# Patient Record
Sex: FEMALE | Race: WHITE | NOT HISPANIC OR LATINO | Employment: UNEMPLOYED | URBAN - METROPOLITAN AREA
[De-identification: names, ages, dates, MRNs, and addresses within clinical notes are randomized per-mention and may not be internally consistent; named-entity substitution may affect disease eponyms.]

---

## 2020-12-01 ENCOUNTER — OFFICE VISIT (OUTPATIENT)
Dept: OBGYN | Facility: CLINIC | Age: 30
End: 2020-12-01
Payer: COMMERCIAL

## 2020-12-01 DIAGNOSIS — O92.70 LACTATION PROBLEM: ICD-10-CM

## 2020-12-01 DIAGNOSIS — O92.29 SORE NIPPLES DUE TO LACTATION: ICD-10-CM

## 2020-12-01 PROCEDURE — 99205 OFFICE O/P NEW HI 60 MIN: CPT | Performed by: NURSE PRACTITIONER

## 2020-12-01 ASSESSMENT — PATIENT HEALTH QUESTIONNAIRE - PHQ9: CLINICAL INTERPRETATION OF PHQ2 SCORE: 0

## 2020-12-02 NOTE — PROGRESS NOTES
Summary: Exclusively breastfeeding but both nipples cracked and scabbed. Engorgement started. Assisted latch to both breasts, with deep asymmetrical firm latch to overcorrect for the sore nipples. Discussed different nipple tx strategies. Transferred 45ml total day 3 of life. Continue breastfeeding both breasts. Follow up early next week.     Subjective:     Raysa Stephen is a 30 y.o. female here for lactation care. She is here today with baby and  (Renzo).    Concerns:   Latch on difficulties , engorgement, cracked/bleeding nipples , nipple pain , sleepy baby and baby always seems hungry     HPI:   Pertinent  history:   Mother does not have a history of advanced maternal age, GDM, hypertension prior to pregnancy, insulin resistance, multiple gestation, PCOS and thyroid disease. Common condition(s) which may interfere with milk supply.  Breast changes in pregnancy: Yes  History of breast surgeries: No      FEEDING HISTORY:    Past breastfeeding history:  First baby   Hospital course: Soreness started worked on latch at [facility].  Currently: Exclusively breastfeeding but both nipples cracked and scabbed. Engorgement started.  Both breasts: Yes most of the time  Bottle feeds: 0x/24h  Supplement: None  Nipple Shield Use: None    Breast Pumping:   Type of pump: None    Maternal ROS:  Constitutional: No fever, chills. Feeling well  Gastrointestinal: Negative for nausea and vomiting  Breasts: No soreness of breasts and Soreness of nipples  Psychiatric: No mood changes, Not experiencing anxiety and Managing ok  Mental Health: No mention of feeling irritable, agitated, angry, overwhelmed, apathy, exhaustion nor having sleep changes outside infant feeds/demands or appetite changes     Objective:     Physical   General: no acute distress  Neurological:  Alert and oriented x3  Breasts: Symetrical , Full, Engorged, Plugged Duct - no evidence and Mastitis  - no S/S  Nipples: abraded, bleeding, cracked,  erythematous, fissure, scabbed/crusting and across face of nipple. Right nipple is fissured at 11 o clock.  Psychiatric:  Normal mood and affect. Her behavior is normal. Judgment and thought content normal   Mental Health:  Did NOT exhibit sadness, crying, feeling overwhelmed, agitation or hypervigilance.    Assessment/Plan & Lactation Counseling:     Infant Weight History:   20 8#2oz  2020 7#10.6oz    Infant intake at Breast:: L 1.0oz     R 0.5oz    Total: 1.5oz day 3  Milk Transfer at this feeding:   Effective breastfeedingInitiation of Feeding: Infant initiates  Position of Feeding:    Right: football  Left: cross cradle  Attachment Achieved: rapidly  Nipple shield: N/A    Discussed  Suck Pattern at the breast: Suck burst and normal rest  Behavior Following Observed Feeding: content  Nipple Pain from:Contact forces of the tongue causing nipple strain resulting in damage and Nipple damage from accumulated microtrauma which lowered failure strength resulting in sudden damage     Latch: Assisted latch  Suckling/Feeding: attaches, audible swallows, baby fed effectively, baby roots, elicits RICHARD and rhythmic  Milk Supply Available: normal  And building    Maternal Diagnosis/Problem:  Cracked nipples due to lactation   Lactation problem shallow latch  Sore nipples due to lactation    BREASTFEEDING PLAN  Discussed concerns and symptoms as listed above in assessment and guidance summarized below.  • Topics reviewed included:  • The nature of infants oral head/neck structure and function and its impact on latch and transfer of milk.   o Observed lingual frenulum  Neck preference this is a normal  finding that should correct itself over the next few weeks.    However when there is a neck preference it can make latching more difficult.    Mom can  use cross cradle on the left and football on the right    Infant head  can be supported in the car seat.    Bottle feeding baby's  can be encouraged to look to  the opposite side of the preference  • Infant can be can talked to from the side encouraging baby to turn to.   •  Sleep or lack of and discussed strategies to manage restorative sleep, although short amounts, significant to the mental health of the mother.  •  Self Care. Discussed support, rest, getting out of the house each day, walk to the mailbox or drive somewhere,  a treat at a drive thru  • Milk supply is dependent on glandular tissue development, hormonal influences, how many times the baby removes milk and how well the breasts are emptied in a 24 hour period. This is a biological reality that we can NOT work around. If, for any reason, your baby is not latching, or you are not able to nurse, then it is important for you to remove the milk instead by pumping or hand expression.  There's no magic trick, tea, food, drink, cookie or supplement that will increase your milk supply. One  must  effectively remove milk to continue to make and maximize milk. In the early days and weeks that can be 8+ times in 24 hours. For older babies, on average 6-7 + times in 24 hours.    •  Feeding:   o Feed your baby every 1.5-3 hours, more often if baby acts hungry.   o Awaken baby for feeding if going over 3 hours in the day.   o Until back to birth weight, ONE four hour at night is acceptable if has had 8 prior feedings in 24 hours.    o Need to get in 8-12 feedings per 24 hours.      o  When bottle-feeding, there are three primary things to consider:    o Nipple Shape:  - Look for a nipple that looks like a “breast at work” not a “breast at rest.”  A “breast at work” has a somewhat cone shape as the nipple and breast tissue is pulled into the baby’s mouth while feeding.  Your baby’s mouth should be able to go around the widest part of the nipple to form a wide-open gape on the bottle like that of a good latch at the breast. In contrast, a breast at rest might look more like, well, a breast: a roundish base with a long  skinny nipple.  If the bottle looks like this, your baby’s lips may not be able to get around the widest part of the nipple because it is just too wide resulting in a narrow gape that would hurt on your nipple. This nipple shape may also make it difficult for your baby to make a complete seal with their lips which leads to air intake and milk spillage.  o Flow Rate of the Nipple:  - The nipple flow should be slow.  Don’t just read the label, but notice how your baby is feeding and trust what you observe.  A study done a few years ago found that “slow flow” varied widely between brands and even between nipples of the same brand.  Try several nipples until you find one that results in a rhythmic sucking pattern but not chugging and gulping.  o Pacing the feeding:  - A slow flow nipple helps, but how you feed the baby is more important.  Good positioning can compensate for a faster flow nipple.  When bottle-feeding, the baby should control how much is consumed at a feeding.  Holding the baby in an upright position with the bottle horizontal ensures that the baby gets milk only when sucking.  Here is a nice video demonstrating this concept of paced bottle feeding,  https://www.[employer].com/watch?v=UdREJ5mIE4Y  •  Supplement:   o No supplement is needed    • Positioning Techniques for bare breast  o Suggested positions Cross cradle and Football  o Fine tune position by making sure your fingers beneath the breast as well as your bra, are out of the way of your baby's chin.  o Positioning:  Many positions shown, great sidelying at 7 minutes.   o See http://globalhealthmedia.org/portfolio-items/positions-for-breastfeeding/?ponzjtotuRU=42217  •  Latch on Techniques for bare breast. Modify for nipple shield use  o Fine tune latch:  - By holding your baby more securely at the breast, assisting your baby to stay attached by:  - Bringing your baby to your breast, not breast to the baby  - Your baby's cheek to touch breast  securely, nose tipped back  - Hold your baby firmly in place so when your baby forgets to suck and picks it back up again your baby is in the correct spot. You will be extinguishing behavior and replacing it with a deeper latch to stimulate suck and provide satisfaction at the breast  - Your baby needs as much breast as deep in the mouth as possible to allow your nipples to heal and for you more importantly to maximize efficiency at the breast  - Latch is asymmetrical, leading with the chin, getting more underneath.  •  Nipple shield: We prefer the 24mm Medela if you are going to use it. Before applying, roll shield in on itself and allow breast to be pulled  in to the tip.  • Nipple care:  • May apply breastmilk  • Moist-oily ointment after feeding/pumping, ie Lanolin nipple butter, coconut or olive oil, if desired/needed 2-3 times/day until nipples are healed  • You do not need to wash this off before pumping or feeding the baby  • You may want to remove baby from breast when active swallowing stops to avoid prolonged nipple compression  • Soft shells recommended  Hydrogels recommended      Connect with other mothers:  o Facebook:   - [place] Breastfeeds: [email or web address]  - Well-Nourished Babies (Private group for questions and support): https://www.facebook.com/groups/358666455637553/  o Breastfeeding [place] for support not assessment: Tuesday  at 11am by [employer],  you will be sent an invitation.   - You may instead copy and paste this link:    [email or web address]    - Passcode  [id number]  - You may share this link with friends; please don't post on social media      Follow up   Mom is encouraged to e-mail to update how the plan is working.  Pediatrician appointment: 12/11/20    Follow-up for infant weight check and dyad breastfeeding evaluation in 6-7 day(s)  Please call [phone] if you have not scheduled your next  appointment    A total of 60 minutes, this does not include infant assessment time, were spent face to face with more than 50% spent counseling and coordinating care as detailed in the above note.     PLEASE NOTE: Some of this note was created using voice recognition software. I have made every reasonable attempt to correct obvious errors, but I expect that there may be errors of grammar and possibly content that I did not discover prior finalizing this note.  NURY Garcia.

## 2020-12-08 ENCOUNTER — OFFICE VISIT (OUTPATIENT)
Dept: OBGYN | Facility: CLINIC | Age: 30
End: 2020-12-08
Payer: COMMERCIAL

## 2020-12-08 DIAGNOSIS — O92.70 LACTATION PROBLEM: ICD-10-CM

## 2020-12-08 PROCEDURE — 99215 OFFICE O/P EST HI 40 MIN: CPT | Performed by: NURSE PRACTITIONER

## 2020-12-08 NOTE — PROGRESS NOTES
Summary: Exclusively breastfeeding, no pumping with appropriate weight gain. Nipples healed. Today he was not as interested in the feeding but removed 0.8oz from the left breast without maternal pain, independent latch.  Introduced pump and removed 60ml, transitional milk for a night feeding by dad.  Continue breastfeeding both breasts, may introduce pumping and a bottle Lingual frenulum not creating discomfort. Follow up in 2 weeks.     Subjective:     Raysa Stephen is a 30 y.o. female here for lactation care. She is here today with baby and  (Renzo).    Concerns:   Weight and feeding evaluation  HPI:   Pertinent  history:   Mother does not have a history of advanced maternal age, GDM, hypertension prior to pregnancy, insulin resistance, multiple gestation, PCOS and thyroid disease. Common condition(s) which may interfere with milk supply.  Breast changes in pregnancy: Yes  History of breast surgeries: No      FEEDING HISTORY:    Past breastfeeding history:  First baby   Hospital course: Soreness started worked on latch at [facility].  Prior to visit on 20: Exclusively breastfeeding but both nipples cracked and scabbed. Engorgement started.  Currently 20:Exclusively breastfeeding, no pumping  Both breasts: Yes most of the time  Bottle feeds: 0x/24h  Supplement: None  Nipple Shield Use: None    Breast Pumping:   Frequency: None  Type of pump: Medela PNS    Maternal ROS:  Constitutional: No fever, chills. Feeling well  Gastrointestinal: Negative for nausea and vomiting  Breasts: No soreness of breasts or  nipples  Psychiatric: No mood changes, Not experiencing anxiety and Managing ok  Mental Health: No mention of feeling irritable, agitated, angry, overwhelmed, apathy, exhaustion nor having sleep changes outside infant feeds/demands or appetite changes     Objective:     Physical   General: no acute distress  Neurological:  Alert and oriented x3  Breasts: Symetrical , Plugged Duct - no  evidence and Mastitis  - no S/S  Nipples:intact  Psychiatric:  Normal mood and affect. Her behavior is normal. Judgment and thought content normal   Mental Health:  Did NOT exhibit sadness, crying, feeling overwhelmed, agitation or hypervigilance.    Assessment/Plan & Lactation Counseling:     Infant Weight History:   20 8#2oz  2020 7#10.6oz  20207#15.9oz  5oz in 7 days Follow ped office in 3 days    Infant intake at Breast:: L 0.8oz    R 0ml   Total: 0.8oz  Milk Transfer at this feeding:   Effective breastfeeding  Given history, but not this feeding. Watching  Initiation of Feeding: Infant initiates  Position of Feeding:    Right: cross cradle  Left: cross cradle  Attachment Achieved: rapidly  Nipple shield: N/A    Discussed  Suck Pattern at the breast: Suck burst and normal rest  Behavior Following Observed Feeding: content  Nipple Pain None  Latch: Assisted latch  Suckling/Feeding: attaches, audible swallows, baby fed effectively, baby roots, elicits RICHARD and rhythmic  Milk Supply Available: normal  Transitional milk so supply still building    Maternal Diagnosis/Problem:  Lactation Problem: management    BREASTFEEDING PLAN  Discussed concerns and symptoms as listed above in assessment and guidance summarized below.  Topics reviewed included:  • The nature of infants oral head/neck structure and function and its impact on latch and transfer of milk.   o Observed lingual frenulum  Neck preference this is a normal  finding that should correct itself over the next few weeks.    However when there is a neck preference it can make latching more difficult.    Mom can  use cross cradle on the left and football on the right    Infant head  can be supported in the car seat.    Bottle feeding baby's  can be encouraged to look to the opposite side of the preference  • Infant can be can talked to from the side encouraging baby to turn to.   •  Sleep or lack of and discussed strategies to manage  restorative sleep, although short amounts, significant to the mental health of the mother.  •  Self Care. Discussed support, rest, getting out of the house each day, walk to the mailbox or drive somewhere,  a treat at a drive thru  • Milk supply is dependent on glandular tissue development, hormonal influences, how many times the baby removes milk and how well the breasts are emptied in a 24 hour period. This is a biological reality that we can NOT work around. If, for any reason, your baby is not latching, or you are not able to nurse, then it is important for you to remove the milk instead by pumping or hand expression.  There's no magic trick, tea, food, drink, cookie or supplement that will increase your milk supply. One  must  effectively remove milk to continue to make and maximize milk. In the early days and weeks that can be 8+ times in 24 hours. For older babies, on average 6-7 + times in 24 hours.    •  Feeding:   o Feed your baby every 1.5-3 hours, more often if baby acts hungry.   o Awaken baby for feeding if going over 3 hours in the day.   o Until back to birth weight, ONE four hour at night is acceptable if has had 8 prior feedings in 24 hours.    o Need to get in 8-12 feedings per 24 hours  o  When bottle-feeding, there are three primary things to consider:    o Nipple Shape:  - Look for a nipple that looks like a “breast at work” not a “breast at rest.”  A “breast at work” has a somewhat cone shape as the nipple and breast tissue is pulled into the baby’s mouth while feeding.  Your baby’s mouth should be able to go around the widest part of the nipple to form a wide-open gape on the bottle like that of a good latch at the breast. In contrast, a breast at rest might look more like, well, a breast: a roundish base with a long skinny nipple.  If the bottle looks like this, your baby’s lips may not be able to get around the widest part of the nipple because it is just too wide resulting in a  narrow gape that would hurt on your nipple. This nipple shape may also make it difficult for your baby to make a complete seal with their lips which leads to air intake and milk spillage.  o Flow Rate of the Nipple:  - The nipple flow should be slow.  Don’t just read the label, but notice how your baby is feeding and trust what you observe.  A study done a few years ago found that “slow flow” varied widely between brands and even between nipples of the same brand.  Try several nipples until you find one that results in a rhythmic sucking pattern but not chugging and gulping.  o Pacing the feeding: Reviewed  - A slow flow nipple helps, but how you feed the baby is more important.  Good positioning can compensate for a faster flow nipple.  When bottle-feeding, the baby should control how much is consumed at a feeding.  Holding the baby in an upright position with the bottle horizontal ensures that the baby gets milk only when sucking.  Here is a nice video demonstrating this concept of paced bottle feeding,  https://www.youSpeedyboyube.com/watch?v=FaHEQ0uRI0Z  •  Supplement:   o No supplement is needed    Positioning and latch mastered     Connect with other mothers:  o Facebook:   - [place] Breast[facility]: [email or web address].[email or web address]/[email or web address].[email or web address]  - Well-Nourished Babies (Private group for questions and support): https://www.facebook.com/groups/368079001180679/  o Breastfeeding [place] for support not assessment: Tuesday  at 11am by [employer],  you will be sent an invitation.   - You may instead copy and paste this link:    [email or web address]    - Passcode  [id number]  - You may share this link with friends; please don't post on social media      Follow up   Mom is encouraged to e-mail to update how the plan is working.  Pediatrician appointment: 12/11/20  Follow-up for infant weight check and dyad breastfeeding evaluation in 14 day(s)  Please call [phone] if you  have not scheduled your next appointment    A total of 60 minutes, this does not include infant assessment time, were spent face to face with more than 50% spent counseling and coordinating care as detailed in the above note.     PLEASE NOTE: Some of this note was created using voice recognition software. I have made every reasonable attempt to correct obvious errors, but I expect that there may be errors of grammar and possibly content that I did not discover prior finalizing this note.  NURY Garcia.

## 2020-12-17 ENCOUNTER — OFFICE VISIT (OUTPATIENT)
Dept: OBGYN | Facility: CLINIC | Age: 30
End: 2020-12-17
Payer: COMMERCIAL

## 2020-12-17 DIAGNOSIS — O92.70 LACTATION PROBLEM: ICD-10-CM

## 2020-12-17 PROCEDURE — 99215 OFFICE O/P EST HI 40 MIN: CPT | Performed by: NURSE PRACTITIONER

## 2020-12-17 NOTE — PROGRESS NOTES
Summary: Exclusively breastfeeding 10-12x/day, no long night breaks yet.  Pumping for dad to give a nighttime bottle.   Nipples healed. Today he removed 2.5oz from the left breast, the one with more milk and an additional 0.3oz from the right. Self regulates well. Hiccups. Continue breastfeeding both breasts,  follow up in 2 weeks if needed.     Subjective:     Raysa Stephen is a 30 y.o. female here for lactation care. She is here today with baby and  (Renzo).    Concerns:   Weight and feeding evaluation    HPI:   Pertinent  history:   Mother does not have a history of advanced maternal age, GDM, hypertension prior to pregnancy, insulin resistance, multiple gestation, PCOS and thyroid disease. Common condition(s) which may interfere with milk supply.  Breast changes in pregnancy: Yes  History of breast surgeries: No      FEEDING HISTORY:    Past breastfeeding history:  First baby   Hospital course: Soreness started worked on latch at [facility].  Prior to visit on 20: Exclusively breastfeeding but both nipples cracked and scabbed. Engorgement started.  Prior to  20:Exclusively breastfeeding, no pumping  Currently 19 Exclusively breastfeeding 10-12x/day, no long night breaks yet.  Pumping for dad to give a nighttime bottle.   Nipples healed.     Both breasts: Yes most of the time  Bottle feeds: 0x/24h  Supplement: None  Nipple Shield Use: None    Breast Pumping:   Frequency: None  Type of pump: Medela PNS    Maternal ROS:  Constitutional: No fever, chills. Feeling well  Gastrointestinal: Negative for nausea and vomiting  Breasts: No soreness of breasts or  nipples  Psychiatric: No mood changes, Not experiencing anxiety and Managing ok  Mental Health: No mention of feeling irritable, agitated, angry, overwhelmed, apathy, exhaustion nor having sleep changes outside infant feeds/demands or appetite changes     Objective:     Physical   General: no acute distress  Neurological:  Alert and  oriented x3  Breasts: Symetrical , Plugged Duct - no evidence and Mastitis  - no S/S  Nipples:intact  Psychiatric:  Normal mood and affect. Her behavior is normal. Judgment and thought content normal   Mental Health:  Did NOT exhibit sadness, crying, feeling overwhelmed, agitation or hypervigilance.    Assessment/Plan & Lactation Counseling:     Infant Weight History:   11/28/20     8#2oz  12/1/2020   7#10.6oz  12/8/2020   7#15.9oz  5oz in 7 days Follow ped office in 3 days  12/17/20     8#10.0oz  Infant intake at Breast:: L 2.5oz    R 0.3oz  Total: 2.8oz  Milk Transfer at this feeding:   Effective breastfeeding    Initiation of Feeding: Infant initiates  Position of Feeding:    Right: cross cradle  Left: cross cradle  Attachment Achieved: rapidly  Nipple shield: N/A    Discussed  Suck Pattern at the breast: Suck burst and normal rest  Behavior Following Observed Feeding: content  Nipple Pain None  Latch: Assisted latch  Suckling/Feeding: attaches, audible swallows, baby fed effectively, baby roots, elicits RICHARD and rhythmic  Milk Supply Available: normal      Maternal Diagnosis/Problem:  Lactation Problem: management    BREASTFEEDING PLAN  Discussed concerns and symptoms as listed above in assessment and guidance summarized below.  Topics reviewed included:  •  Self Care. Discussed support, rest, getting out of the house each day, walk to the mailbox or drive somewhere,  a treat at a drive thru    • Milk supply is dependent on glandular tissue development, hormonal influences, how many times the baby removes milk and how well the breasts are emptied in a 24 hour period. This is a biological reality that we can NOT work around. If, for any reason, your baby is not latching, or you are not able to nurse, then it is important for you to remove the milk instead by pumping or hand expression.  There's no magic trick, tea, food, drink, cookie or supplement that will increase your milk supply. One  must  effectively  remove milk to continue to make and maximize milk. In the early days and weeks that can be 8+ times in 24 hours. For older babies, on average 6-7 + times in 24 hours.      •  Feeding:   o Feed your baby every 1.5-2.5 hours, more often if baby acts hungry.   o Awaken baby for feeding if going over 3 hours in the day.   o Until back to birth weight, ONE four hour at night is acceptable if has had 8 prior feedings in 24 hours.    - Need to get in 10-12 feedings per 24 hours  •  Supplement:   o No supplement is needed    Positioning and latch mastered     Connect with other mothers: Thank you for joining the group    Follow up   Mom is encouraged to e-mail to update how the plan is working.  Pediatrician appointment: January, 2 month [facility]  Follow-up for infant weight check and dyad breastfeeding evaluation in 14 day(s) if needed  Please call [phone] if you have not scheduled your next appointment    A total of 55 minutes, this does not include infant assessment time, were spent face to face with more than 50% spent counseling and coordinating care as detailed in the above note.     PLEASE NOTE: Some of this note was created using voice recognition software. I have made every reasonable attempt to correct obvious errors, but I expect that there may be errors of grammar and possibly content that I did not discover prior finalizing this note.  NURY Garcia.

## 2021-02-05 ENCOUNTER — OFFICE VISIT (OUTPATIENT)
Dept: OBGYN | Facility: CLINIC | Age: 31
End: 2021-02-05
Payer: COMMERCIAL

## 2021-02-05 DIAGNOSIS — O92.70 LACTATION PROBLEM: ICD-10-CM

## 2021-02-05 PROCEDURE — 99215 OFFICE O/P EST HI 40 MIN: CPT | Performed by: NURSE PRACTITIONER

## 2021-02-05 PROCEDURE — G2212 PROLONG OUTPT/OFFICE VIS: HCPCS | Performed by: NURSE PRACTITIONER

## 2021-02-05 NOTE — PROGRESS NOTES
Summary: Exclusively breastfeeding 8+x/day, established sleeping pattern 930-6am, feeding at 230am.  Pumping after morning feeding for 5 oz to freeze or  for dad to give a bottle.    Spitting up frequently but not in pain. Today he removed 2.8oz from the right breast, and an hour later an additional 1.1oz from the left side. Had spit up 12ml in between sides. Feeding well but losing 2oz per day with spit up and not making it up. Plan to keep sleep routine, feed short times per [place] routine but more frequently, to finish the feeding like today.  Do not interrupt naps but dream feed after 2 hours.  Continue offering both breast. Mild brachiocephaly noted, does tummy time, will see Ped PT for exercises. Will begin to wear baby.  Follow up weight check in a week. Consult in 10-14 days.     Subjective:     Raysa Stephen is a 30 y.o. female here for lactation care. She is here today with babyBrooklyn    Concerns:   Weight and feeding evaluation, spitting up, growth has slowed    HPI:   Pertinent  history:   Mother does not have a history of advanced maternal age, GDM, hypertension prior to pregnancy, insulin resistance, multiple gestation, PCOS and thyroid disease. Common condition(s) which may interfere with milk supply.  Breast changes in pregnancy: Yes  History of breast surgeries: No      FEEDING HISTORY:    Past breastfeeding history:  First baby   Hospital course: Soreness started worked on latch at [facility].  Prior to visit on 20: Exclusively breastfeeding but both nipples cracked and scabbed. Engorgement started.  Prior to visit on  20:Exclusively breastfeeding, no pumping  Prior to visit on  20 Exclusively breastfeeding 10-12x/day, no long night breaks yet.  Pumping for dad to give a nighttime bottle.   Nipples healed.   Currently 20 Exclusively breastfeeding 8+x/day, established sleeping pattern 930-6am, feeding at 230am.  Pumping after morning feeding for 5 oz to freeze or   for dad to give a bottle.    Spitting up frequently but not in pain    Both breasts: Yes most of the time  Bottle feeds: 1x/24h several times per week  Supplement: None  Nipple Shield Use: None    Breast Pumping:   Frequency: None  Type of pump: Medela PNS    Maternal ROS:  Constitutional: No fever, chills. Feeling well  Gastrointestinal: Negative for nausea and vomiting  Breasts: No soreness of breasts or  nipples  Psychiatric: No mood changes, Not experiencing anxiety and Managing ok  Mental Health: No mention of feeling irritable, agitated, angry, overwhelmed, apathy, exhaustion nor having sleep changes outside infant feeds/demands or appetite changes     Objective:     Physical   General: no acute distress  Neurological:  Alert and oriented x3  Breasts: Symetrical , Plugged Duct - no evidence and Mastitis  - no S/S  Nipples:intact  Psychiatric:  Normal mood and affect. Her behavior is normal. Judgment and thought content normal   Mental Health:  Did NOT exhibit sadness, crying, feeling overwhelmed, agitation or hypervigilance.    Assessment/Plan & Lactation Counseling:     Infant Weight History:   11/28/20     8#2oz  12/1/2020   7#10.6oz  12/8/2020   7#15.9oz  5oz in 7 days Follow ped office in 3 days  12/17/20     8#10.0oz  2/5/21  11#0.1oz Percentile slowed, 4.1oz gain from peds office 9 days ago.    Infant intake at Breast:: L 1.1oz    R 2.8oz  Total: 3.9oz  Milk Transfer at this feeding:   Effective breastfeeding    Initiation of Feeding: Infant initiates  Position of Feeding:    Right: cradle  Left: cradle  Attachment Achieved: rapidly  Nipple shield: N/A    Discussed  Suck Pattern at the breast: Suck burst and normal rest  Behavior Following Observed Feeding: content  Nipple Pain None  Latch: Assisted latch  Suckling/Feeding: attaches, audible swallows, baby fed effectively, baby roots, elicits RICHARD and rhythmic  Milk Supply Available: normal      Maternal Diagnosis/Problem:  Lactation Problem: management  "with reflux and slow gain    BREASTFEEDING PLAN  Discussed concerns and symptoms as listed above in assessment and guidance summarized below.  Topics reviewed included:  •  Feeding:   o Offer both sides but honor his cues.  o Offer second side an hour later  o Wear him, keeping upright to decrease spitting up    o Dream feed in the day if naps are longer than 2 hours, otherwise \"hourly\" feedings.  - Really just finishing the feeding an hour later   o Maintain sleep routine     o Supplement:   - No supplement is needed    Positioning and latch mastered     Continue to connect with other mothers     Follow up   Mom is encouraged to e-mail to update how the plan is working.  Pediatrician appointment: March, 4 month [facility]  Follow-up for infant weight check and dyad breastfeeding evaluation in 7 day(s)   Please call [phone] if you have not scheduled your next appointment    A total of 92 minutes, this does not include infant assessment time, were spent face to face with more than 50% spent counseling and coordinating care as detailed in the above note.     PLEASE NOTE: Some of this note was created using voice recognition software. I have made every reasonable attempt to correct obvious errors, but I expect that there may be errors of grammar and possibly content that I did not discover prior finalizing this note.  NURY Garcia.  "

## 2021-12-17 LAB
ABO GROUP BLD: NORMAL
BLD GP AB SCN SERPL QL: NEGATIVE
C TRACH DNA GENITAL QL NAA+PROBE: NEGATIVE
HBV SURFACE AG SERPL QL IA: NEGATIVE
N GONORRHOEA DNA GENITAL QL NAA+PROBE: NEGATIVE
RH BLD: POSITIVE
RUBV IGG SERPL IA-ACNC: NORMAL

## 2022-04-29 LAB
GLUCOSE 1H P 50 G GLC PO SERPL-MCNC: 108 MG/DL
TREPONEMA PALLIDUM IGG+IGM AB [PRESENCE] IN SERUM OR PLASMA BY IMMUNOASSAY: NORMAL

## 2022-06-29 LAB — GP B STREP DNA SPEC QL NAA+PROBE: NEGATIVE

## 2022-07-22 ENCOUNTER — HOSPITAL ENCOUNTER (INPATIENT)
Facility: MEDICAL CENTER | Age: 32
LOS: 1 days | End: 2022-07-23
Attending: OBSTETRICS & GYNECOLOGY | Admitting: OBSTETRICS & GYNECOLOGY
Payer: COMMERCIAL

## 2022-07-22 ENCOUNTER — ANESTHESIA EVENT (OUTPATIENT)
Dept: ANESTHESIOLOGY | Facility: MEDICAL CENTER | Age: 32
End: 2022-07-22
Payer: COMMERCIAL

## 2022-07-22 ENCOUNTER — ANESTHESIA (OUTPATIENT)
Dept: ANESTHESIOLOGY | Facility: MEDICAL CENTER | Age: 32
End: 2022-07-22
Payer: COMMERCIAL

## 2022-07-22 DIAGNOSIS — Z91.89 AT RISK FOR BREASTFEEDING DIFFICULTY: Primary | ICD-10-CM

## 2022-07-22 LAB
BASOPHILS # BLD AUTO: 0.4 % (ref 0–1.8)
BASOPHILS # BLD: 0.04 K/UL (ref 0–0.12)
EOSINOPHIL # BLD AUTO: 0.07 K/UL (ref 0–0.51)
EOSINOPHIL NFR BLD: 0.6 % (ref 0–6.9)
ERYTHROCYTE [DISTWIDTH] IN BLOOD BY AUTOMATED COUNT: 39.6 FL (ref 35.9–50)
HCT VFR BLD AUTO: 37.6 % (ref 37–47)
HGB BLD-MCNC: 13.3 G/DL (ref 12–16)
HOLDING TUBE BB 8507: NORMAL
IMM GRANULOCYTES # BLD AUTO: 0.08 K/UL (ref 0–0.11)
IMM GRANULOCYTES NFR BLD AUTO: 0.7 % (ref 0–0.9)
LYMPHOCYTES # BLD AUTO: 1.38 K/UL (ref 1–4.8)
LYMPHOCYTES NFR BLD: 12.3 % (ref 22–41)
MCH RBC QN AUTO: 30.2 PG (ref 27–33)
MCHC RBC AUTO-ENTMCNC: 35.4 G/DL (ref 33.6–35)
MCV RBC AUTO: 85.3 FL (ref 81.4–97.8)
MONOCYTES # BLD AUTO: 0.78 K/UL (ref 0–0.85)
MONOCYTES NFR BLD AUTO: 7 % (ref 0–13.4)
NEUTROPHILS # BLD AUTO: 8.86 K/UL (ref 2–7.15)
NEUTROPHILS NFR BLD: 79 % (ref 44–72)
NRBC # BLD AUTO: 0 K/UL
NRBC BLD-RTO: 0 /100 WBC
PLATELET # BLD AUTO: 218 K/UL (ref 164–446)
PMV BLD AUTO: 10.2 FL (ref 9–12.9)
RBC # BLD AUTO: 4.41 M/UL (ref 4.2–5.4)
WBC # BLD AUTO: 11.2 K/UL (ref 4.8–10.8)

## 2022-07-22 PROCEDURE — A9270 NON-COVERED ITEM OR SERVICE: HCPCS | Performed by: OBSTETRICS & GYNECOLOGY

## 2022-07-22 PROCEDURE — 770002 HCHG ROOM/CARE - OB PRIVATE (112)

## 2022-07-22 PROCEDURE — 85025 COMPLETE CBC W/AUTO DIFF WBC: CPT

## 2022-07-22 PROCEDURE — 700105 HCHG RX REV CODE 258: Performed by: OBSTETRICS & GYNECOLOGY

## 2022-07-22 PROCEDURE — 59409 OBSTETRICAL CARE: CPT

## 2022-07-22 PROCEDURE — 36415 COLL VENOUS BLD VENIPUNCTURE: CPT

## 2022-07-22 PROCEDURE — 304965 HCHG RECOVERY SERVICES

## 2022-07-22 PROCEDURE — 302449 STATCHG TRIAGE ONLY (STATISTIC)

## 2022-07-22 PROCEDURE — 700111 HCHG RX REV CODE 636 W/ 250 OVERRIDE (IP): Performed by: OBSTETRICS & GYNECOLOGY

## 2022-07-22 PROCEDURE — 700102 HCHG RX REV CODE 250 W/ 637 OVERRIDE(OP): Performed by: OBSTETRICS & GYNECOLOGY

## 2022-07-22 RX ORDER — SODIUM CHLORIDE, SODIUM LACTATE, POTASSIUM CHLORIDE, CALCIUM CHLORIDE 600; 310; 30; 20 MG/100ML; MG/100ML; MG/100ML; MG/100ML
INJECTION, SOLUTION INTRAVENOUS CONTINUOUS
Status: CANCELLED | OUTPATIENT
Start: 2022-07-22

## 2022-07-22 RX ORDER — MISOPROSTOL 200 UG/1
600 TABLET ORAL
Status: DISCONTINUED | OUTPATIENT
Start: 2022-07-22 | End: 2022-07-23 | Stop reason: HOSPADM

## 2022-07-22 RX ORDER — TERBUTALINE SULFATE 1 MG/ML
0.25 INJECTION, SOLUTION SUBCUTANEOUS
Status: DISCONTINUED | OUTPATIENT
Start: 2022-07-22 | End: 2022-07-22 | Stop reason: HOSPADM

## 2022-07-22 RX ORDER — OXYCODONE HYDROCHLORIDE 5 MG/1
5 TABLET ORAL EVERY 4 HOURS PRN
Status: DISCONTINUED | OUTPATIENT
Start: 2022-07-22 | End: 2022-07-23 | Stop reason: HOSPADM

## 2022-07-22 RX ORDER — OXYTOCIN 10 [USP'U]/ML
10 INJECTION, SOLUTION INTRAMUSCULAR; INTRAVENOUS
Status: CANCELLED | OUTPATIENT
Start: 2022-07-22

## 2022-07-22 RX ORDER — DOCUSATE SODIUM 100 MG/1
100 CAPSULE, LIQUID FILLED ORAL 2 TIMES DAILY
Status: DISCONTINUED | OUTPATIENT
Start: 2022-07-22 | End: 2022-07-23 | Stop reason: HOSPADM

## 2022-07-22 RX ORDER — OXYTOCIN 10 [USP'U]/ML
10 INJECTION, SOLUTION INTRAMUSCULAR; INTRAVENOUS
Status: DISCONTINUED | OUTPATIENT
Start: 2022-07-22 | End: 2022-07-22 | Stop reason: HOSPADM

## 2022-07-22 RX ORDER — VITAMIN A ACETATE, BETA CAROTENE, ASCORBIC ACID, CHOLECALCIFEROL, .ALPHA.-TOCOPHEROL ACETATE, DL-, THIAMINE MONONITRATE, RIBOFLAVIN, NIACINAMIDE, PYRIDOXINE HYDROCHLORIDE, FOLIC ACID, CYANOCOBALAMIN, CALCIUM CARBONATE, FERROUS FUMARATE, ZINC OXIDE, CUPRIC OXIDE 3080; 12; 120; 400; 1; 1.84; 3; 20; 22; 920; 25; 200; 27; 10; 2 [IU]/1; UG/1; MG/1; [IU]/1; MG/1; MG/1; MG/1; MG/1; MG/1; [IU]/1; MG/1; MG/1; MG/1; MG/1; MG/1
1 TABLET, FILM COATED ORAL EVERY MORNING
Status: DISCONTINUED | OUTPATIENT
Start: 2022-07-22 | End: 2022-07-23 | Stop reason: HOSPADM

## 2022-07-22 RX ORDER — TERBUTALINE SULFATE 1 MG/ML
0.25 INJECTION, SOLUTION SUBCUTANEOUS
Status: CANCELLED | OUTPATIENT
Start: 2022-07-22

## 2022-07-22 RX ORDER — SODIUM CHLORIDE, SODIUM LACTATE, POTASSIUM CHLORIDE, AND CALCIUM CHLORIDE .6; .31; .03; .02 G/100ML; G/100ML; G/100ML; G/100ML
1000 INJECTION, SOLUTION INTRAVENOUS
Status: DISCONTINUED | OUTPATIENT
Start: 2022-07-22 | End: 2022-07-22 | Stop reason: HOSPADM

## 2022-07-22 RX ORDER — IBUPROFEN 800 MG/1
800 TABLET ORAL EVERY 8 HOURS PRN
Status: DISCONTINUED | OUTPATIENT
Start: 2022-07-22 | End: 2022-07-23 | Stop reason: HOSPADM

## 2022-07-22 RX ORDER — ACETAMINOPHEN 500 MG
1000 TABLET ORAL
Status: CANCELLED | OUTPATIENT
Start: 2022-07-22

## 2022-07-22 RX ORDER — ACETAMINOPHEN 500 MG
1000 TABLET ORAL
Status: COMPLETED | OUTPATIENT
Start: 2022-07-22 | End: 2022-07-22

## 2022-07-22 RX ORDER — ROPIVACAINE HYDROCHLORIDE 2 MG/ML
INJECTION, SOLUTION EPIDURAL; INFILTRATION; PERINEURAL
Status: ACTIVE
Start: 2022-07-22 | End: 2022-07-22

## 2022-07-22 RX ORDER — SODIUM CHLORIDE, SODIUM LACTATE, POTASSIUM CHLORIDE, CALCIUM CHLORIDE 600; 310; 30; 20 MG/100ML; MG/100ML; MG/100ML; MG/100ML
INJECTION, SOLUTION INTRAVENOUS CONTINUOUS
Status: DISCONTINUED | OUTPATIENT
Start: 2022-07-22 | End: 2022-07-23

## 2022-07-22 RX ORDER — IBUPROFEN 800 MG/1
800 TABLET ORAL
Status: CANCELLED | OUTPATIENT
Start: 2022-07-22

## 2022-07-22 RX ORDER — MISOPROSTOL 200 UG/1
800 TABLET ORAL
Status: COMPLETED | OUTPATIENT
Start: 2022-07-22 | End: 2022-07-22

## 2022-07-22 RX ORDER — ONDANSETRON 4 MG/1
4 TABLET, ORALLY DISINTEGRATING ORAL EVERY 6 HOURS PRN
Status: DISCONTINUED | OUTPATIENT
Start: 2022-07-22 | End: 2022-07-22 | Stop reason: HOSPADM

## 2022-07-22 RX ORDER — IBUPROFEN 800 MG/1
800 TABLET ORAL
Status: COMPLETED | OUTPATIENT
Start: 2022-07-22 | End: 2022-07-22

## 2022-07-22 RX ORDER — ACETAMINOPHEN 500 MG
1000 TABLET ORAL EVERY 6 HOURS PRN
Status: DISCONTINUED | OUTPATIENT
Start: 2022-07-22 | End: 2022-07-23 | Stop reason: HOSPADM

## 2022-07-22 RX ORDER — ONDANSETRON 2 MG/ML
4 INJECTION INTRAMUSCULAR; INTRAVENOUS EVERY 6 HOURS PRN
Status: DISCONTINUED | OUTPATIENT
Start: 2022-07-22 | End: 2022-07-22 | Stop reason: HOSPADM

## 2022-07-22 RX ORDER — SODIUM CHLORIDE, SODIUM LACTATE, POTASSIUM CHLORIDE, AND CALCIUM CHLORIDE .6; .31; .03; .02 G/100ML; G/100ML; G/100ML; G/100ML
250 INJECTION, SOLUTION INTRAVENOUS PRN
Status: DISCONTINUED | OUTPATIENT
Start: 2022-07-22 | End: 2022-07-22 | Stop reason: HOSPADM

## 2022-07-22 RX ORDER — SODIUM CHLORIDE, SODIUM LACTATE, POTASSIUM CHLORIDE, CALCIUM CHLORIDE 600; 310; 30; 20 MG/100ML; MG/100ML; MG/100ML; MG/100ML
INJECTION, SOLUTION INTRAVENOUS PRN
Status: DISCONTINUED | OUTPATIENT
Start: 2022-07-22 | End: 2022-07-23 | Stop reason: HOSPADM

## 2022-07-22 RX ORDER — ROPIVACAINE HYDROCHLORIDE 2 MG/ML
INJECTION, SOLUTION EPIDURAL; INFILTRATION; PERINEURAL CONTINUOUS
Status: DISCONTINUED | OUTPATIENT
Start: 2022-07-22 | End: 2022-07-23

## 2022-07-22 RX ADMIN — IBUPROFEN 800 MG: 800 TABLET, FILM COATED ORAL at 07:12

## 2022-07-22 RX ADMIN — ACETAMINOPHEN 1000 MG: 500 TABLET, FILM COATED ORAL at 05:11

## 2022-07-22 RX ADMIN — ACETAMINOPHEN 1000 MG: 500 TABLET, FILM COATED ORAL at 22:17

## 2022-07-22 RX ADMIN — OXYTOCIN 125 ML/HR: 10 INJECTION, SOLUTION INTRAMUSCULAR; INTRAVENOUS at 05:13

## 2022-07-22 RX ADMIN — FENTANYL CITRATE 100 MCG: 50 INJECTION, SOLUTION INTRAMUSCULAR; INTRAVENOUS at 03:39

## 2022-07-22 RX ADMIN — DOCUSATE SODIUM 100 MG: 100 CAPSULE, LIQUID FILLED ORAL at 10:06

## 2022-07-22 RX ADMIN — PRENATAL WITH FERROUS FUM AND FOLIC ACID 1 TABLET: 3080; 920; 120; 400; 22; 1.84; 3; 20; 10; 1; 12; 200; 27; 25; 2 TABLET ORAL at 10:06

## 2022-07-22 RX ADMIN — IBUPROFEN 800 MG: 800 TABLET, FILM COATED ORAL at 20:19

## 2022-07-22 RX ADMIN — OXYCODONE 5 MG: 5 TABLET ORAL at 10:02

## 2022-07-22 RX ADMIN — OXYTOCIN 2000 ML/HR: 10 INJECTION, SOLUTION INTRAMUSCULAR; INTRAVENOUS at 03:40

## 2022-07-22 RX ADMIN — ACETAMINOPHEN 1000 MG: 500 TABLET, FILM COATED ORAL at 13:48

## 2022-07-22 ASSESSMENT — LIFESTYLE VARIABLES
HAVE YOU EVER FELT YOU SHOULD CUT DOWN ON YOUR DRINKING: NO
EVER HAD A DRINK FIRST THING IN THE MORNING TO STEADY YOUR NERVES TO GET RID OF A HANGOVER: NO
TOTAL SCORE: 0
TOTAL SCORE: 0
HAVE PEOPLE ANNOYED YOU BY CRITICIZING YOUR DRINKING: NO
HOW MANY TIMES IN THE PAST YEAR HAVE YOU HAD 5 OR MORE DRINKS IN A DAY: 0
EVER_SMOKED: NEVER
ALCOHOL_USE: NO
CONSUMPTION TOTAL: NEGATIVE
AVERAGE NUMBER OF DAYS PER WEEK YOU HAVE A DRINK CONTAINING ALCOHOL: 0
EVER FELT BAD OR GUILTY ABOUT YOUR DRINKING: NO
TOTAL SCORE: 0
ON A TYPICAL DAY WHEN YOU DRINK ALCOHOL HOW MANY DRINKS DO YOU HAVE: 0

## 2022-07-22 ASSESSMENT — PATIENT HEALTH QUESTIONNAIRE - PHQ9
1. LITTLE INTEREST OR PLEASURE IN DOING THINGS: NOT AT ALL
2. FEELING DOWN, DEPRESSED, IRRITABLE, OR HOPELESS: NOT AT ALL
SUM OF ALL RESPONSES TO PHQ9 QUESTIONS 1 AND 2: 0

## 2022-07-22 ASSESSMENT — PAIN DESCRIPTION - PAIN TYPE
TYPE: ACUTE PAIN

## 2022-07-22 NOTE — PROGRESS NOTES
0700: Pt resting in bed; VSS; pt c/o of moderate pain and requests pain intervention (see MAR);  infant wrapped; FOB at bedside.  0740: Pt up to bathroom; voided effectively; fundus firm; bleeding light.  0745: Pt transported to postpartum, via wheelchair, in stable condition; infant in arms.  0800: Report given to ESTELA Perez.

## 2022-07-22 NOTE — H&P
"Labor and Delivery History and Physical    CC: Contractions    HPI: 30yo  at 40w0d, EDC 22. Pt presents to L&D with complaint of contractions since . No VB or LOF. Baby moving well. She sees Dr. Florez for prenatal care. Her prenatal care has been uncomplicated.     All other systems were reviewed and were negative except as stated above.    PMH: carrier of sickle cell trait, hx Covid    PSH: none    OB HX: 2020 - full term  of male weighing 8.2lbs    Gyn Hx: no abnl paps no hx STIs    SH: no T/E/D,     FH: grandma - breast cancer    Meds: PNV    Allergies: NKDA    /74   Pulse (!) 103   Temp (!) 35.6 °C (96 °F) (Temporal)   Resp (!) 22   Ht 1.727 m (5' 8\")   Wt 74.8 kg (165 lb)   SpO2 99%   BMI 25.09 kg/m²     Physical Exam  Gen: NAD  Abd: soft, gravid, non tender, no rebound or guarding  EFW: 8 pounds by [place]  Ext: no edema, nontender    FHT: 140s, pos accels, neg decels, moderate variability, reactive  [place]: 2-3 minutes  SVE: 8/100/0, AROM with clear fluid    Laboratory Evaluation  ABO: O+  H/H: 13.3/37.6  GBS: neg  GTT: 108  Rubella: Immune  HIV: Neg  RPR: NR  HepBsAg: neg  Hep C neg      Assessment:   30yo  at 40w0d  Active labor  GBS neg    Plan:  Admit to L&D  Fetal monitoring and toco  IV fluids  Pt requests epidural for pain management  Anticipate vaginal delivery      Cindy Bean M.D.    "

## 2022-07-22 NOTE — PROGRESS NOTES
0108: Pt arrived with complaint of contractions every 2-3 min. Pt stated contractions started around 2145 22. No vaginal bleeding, leaking of fluid or decreased fetal movement.   Difficult to maintain FHR tracing on external monitor due to pt preferring to stand at bedside or be on hands and knees. Hand held monitoring necessary , FHR monitor often picks up maternal HR.     0115: SVE by this RN.     0123: Dr. Wills called with report, orders to admit received.     0140: Dr. Wills called to bedside for pt requested AROM.     0204: Dr. Wills at bedside, AROM clear fluid. SVE.     0225: Dr. Wills called to bedside, pt complete and ready to begin pushing.     0240: pushing began, hand held FHR monitor     0331: Viable male infant delivered via . APGARs 8/9.    0515: Fundal rub- moderate bleeding. Called Dr. Bean to update on bleeding. Pt up to bathroom to empty bladder. Recheck at 0545 fundal rub- if bleeding moderately still, give PRN cytotec.     0545: fundus firm, light bleeding, no clots. Holding PRN cytotec for now    0700: Bedside report to  Nany FONSECA RN.

## 2022-07-22 NOTE — PROGRESS NOTES
Patient transferred from labor and delivery to room 311. Patient and family oriented to room and postpartum routine. Assessment done. Patient has no c/o pain. Lochia light  Fundus firm. IV site without redness, swelling or drainage.2nd bag pitocin placed on pump at 125/hr.cuddles alarm is active. Bands matched. Safety and security reviewed.baby placed in mom's arms to breastfeed.

## 2022-07-22 NOTE — L&D DELIVERY NOTE
"Vaginal Delivery Procedure Note:    Raysa Stephen is a 31 y.o. , now Para      Weeks of gestation: 40w0d  Diagnosis: Active labor, GBS neg     of viable male infant named \"Jl\" over intact perineum at 0331. Vertex, OA. Nuchal cord not present. Anterior and posterior shoulders delivered with ease. Nose and mouth suctioned with bulb. Infant placed on maternal abdomen. Delayed cord clamp performed. Cord then clamped and cut by FOB.  APGARs 8 and 9  Birth weight - pending  Placenta delivered intact with fundal massage and gentle cord traction at 0340. 3 Vessel cord.  Laceration: first degree superficial left periurethral abrasion/laceration which was hemostatic and not repaired.  Excellent hemostasis.   mL  Anesthesia - none  Sponge and needle counts correct.  Patient tolerated procedure well. Mother and baby bonding skin to skin.    "

## 2022-07-22 NOTE — ANESTHESIA PREPROCEDURE EVALUATION
Date: 07/22/22  Procedure: Labor Epidural       G1 @ 40w, IUP, Jiang  Relevant Problems   No relevant active problems       Physical Exam    Airway   Mallampati: II  TM distance: >3 FB  Neck ROM: full       Cardiovascular - normal exam  Rhythm: regular  Rate: normal  (-) murmur     Dental - normal exam           Pulmonary - normal exam  Breath sounds clear to auscultation     Abdominal    Neurological - normal exam                 Anesthesia Plan    ASA 2       Plan - epidural   Neuraxial block will be labor analgesia                  Pertinent diagnostic labs and testing reviewed    Informed Consent:    Anesthetic plan and risks discussed with patient.

## 2022-07-23 VITALS
OXYGEN SATURATION: 99 % | DIASTOLIC BLOOD PRESSURE: 64 MMHG | SYSTOLIC BLOOD PRESSURE: 98 MMHG | RESPIRATION RATE: 18 BRPM | WEIGHT: 165 LBS | HEIGHT: 68 IN | BODY MASS INDEX: 25.01 KG/M2 | TEMPERATURE: 98.2 F | HEART RATE: 63 BPM

## 2022-07-23 LAB
ERYTHROCYTE [DISTWIDTH] IN BLOOD BY AUTOMATED COUNT: 40.3 FL (ref 35.9–50)
HCT VFR BLD AUTO: 28.8 % (ref 37–47)
HGB BLD-MCNC: 10 G/DL (ref 12–16)
MCH RBC QN AUTO: 29.8 PG (ref 27–33)
MCHC RBC AUTO-ENTMCNC: 34.7 G/DL (ref 33.6–35)
MCV RBC AUTO: 85.7 FL (ref 81.4–97.8)
PLATELET # BLD AUTO: 175 K/UL (ref 164–446)
PMV BLD AUTO: 10.3 FL (ref 9–12.9)
RBC # BLD AUTO: 3.36 M/UL (ref 4.2–5.4)
WBC # BLD AUTO: 8.5 K/UL (ref 4.8–10.8)

## 2022-07-23 PROCEDURE — 700102 HCHG RX REV CODE 250 W/ 637 OVERRIDE(OP): Performed by: OBSTETRICS & GYNECOLOGY

## 2022-07-23 PROCEDURE — 36415 COLL VENOUS BLD VENIPUNCTURE: CPT

## 2022-07-23 PROCEDURE — 85027 COMPLETE CBC AUTOMATED: CPT

## 2022-07-23 PROCEDURE — A9270 NON-COVERED ITEM OR SERVICE: HCPCS | Performed by: OBSTETRICS & GYNECOLOGY

## 2022-07-23 RX ADMIN — PRENATAL WITH FERROUS FUM AND FOLIC ACID 1 TABLET: 3080; 920; 120; 400; 22; 1.84; 3; 20; 10; 1; 12; 200; 27; 25; 2 TABLET ORAL at 09:52

## 2022-07-23 RX ADMIN — DOCUSATE SODIUM 100 MG: 100 CAPSULE, LIQUID FILLED ORAL at 09:51

## 2022-07-23 ASSESSMENT — EDINBURGH POSTNATAL DEPRESSION SCALE (EPDS)
I HAVE BEEN SO UNHAPPY THAT I HAVE HAD DIFFICULTY SLEEPING: NOT AT ALL
I HAVE BEEN SO UNHAPPY THAT I HAVE BEEN CRYING: NO, NEVER
I HAVE FELT SAD OR MISERABLE: NO, NOT AT ALL
I HAVE BLAMED MYSELF UNNECESSARILY WHEN THINGS WENT WRONG: NO, NEVER
I HAVE LOOKED FORWARD WITH ENJOYMENT TO THINGS: AS MUCH AS I EVER DID
I HAVE FELT SCARED OR PANICKY FOR NO GOOD REASON: NO, NOT AT ALL
THE THOUGHT OF HARMING MYSELF HAS OCCURRED TO ME: NEVER
THINGS HAVE BEEN GETTING ON TOP OF ME: NO, I HAVE BEEN COPING AS WELL AS EVER
I HAVE BEEN ABLE TO LAUGH AND SEE THE FUNNY SIDE OF THINGS: AS MUCH AS I ALWAYS COULD
I HAVE BEEN ANXIOUS OR WORRIED FOR NO GOOD REASON: NO, NOT AT ALL

## 2022-07-23 ASSESSMENT — PAIN DESCRIPTION - PAIN TYPE
TYPE: ACUTE PAIN
TYPE: ACUTE PAIN

## 2022-07-23 NOTE — LACTATION NOTE
This note was copied from a baby's chart.  Mother latching her baby independently, observed slightly shallow latch and assisted to deepen. Reviewed milk onset, cluster feeding, infant diaper output and stool changes, hand expression of colostrum. Discussed feeding  expressed colostrum after breastfeeding for a few days to encourage milk to increase in volume and help prevent excessive weight loss given history of excessive weight loss and slow weight gain with first child. Mother plans to follow-up outpatient with [facility] for ongoing support, referral sent to coordinate. Plan to continue cue based breastfeeding at least 8 or more times per 24 hours, encourage to hand express and feed back after breastfeeding. Parents deny questions/concerns.

## 2022-07-23 NOTE — CARE PLAN
The patient is Stable - Low risk of patient condition declining or worsening    Shift Goals  Clinical Goals: lochia wnl    Progress made toward(s) clinical / shift goals:lochia light and patient states pain med effective with pain control    Patient is not progressing towards the following goals:

## 2022-07-23 NOTE — CARE PLAN
The patient is Stable - Low risk of patient condition declining or worsening    Shift Goals  Clinical Goals: Stable VS    Progress made toward(s) clinical / shift goals:  Pt verbalizes when in pain and in need of pain medication to reach comfort goal. Pt fundus is firm and palpable and lochia is light. Pt resting comfortably throughout shift. Monitoring of pt condition will continue.     Patient is not progressing towards the following goals: N/A    Problem: Pain - Standard  Goal: Alleviation of pain or a reduction in pain to the patient’s comfort goal  Outcome: Progressing     Problem: Altered Physiologic Condition  Goal: Patient physiologically stable as evidenced by normal lochia, palpable uterine involution and vitals within normal limits  Outcome: Progressing

## 2022-07-23 NOTE — DISCHARGE SUMMARY
Discharge Summary:     Date of Admission: 2022  Date of Discharge: 22      Admitting diagnosis:    1. Pregnancy @ 40w0d  2. Active labor      Discharge Diagnosis:   1. Status post vaginal, spontaneous.      History reviewed. No pertinent past medical history.  OB History    Para Term  AB Living   2 2 2     2   SAB IAB Ectopic Molar Multiple Live Births           0 2      # Outcome Date GA Lbr Carter/2nd Weight Sex Delivery Anes PTL Lv   2 Term 22 40w0d / 01:01 3.82 kg (8 lb 6.8 oz) M Vag-Spont None N REED   1 Term 20 38w0d  3.685 kg (8 lb 2 oz) M Vag-Spont  N REED     History reviewed. No pertinent surgical history.  Patient has no allergy information on record.    Hospital Course:   Pt is a 31 y.o. now  who presented for contractions and was found to be in labor. She went on to have a  without complication.     On day of discharge pt was ambulating, voiding spontaneously, tolerating PO, with adequate pain control, and desiring to go home.    Physical Exam:  Temp:  [36.6 °C (97.8 °F)-37 °C (98.6 °F)] 37 °C (98.6 °F)  Pulse:  [57-83] 68  Resp:  [16-18] 16  BP: (110-133)/(69-80) 118/71  SpO2:  [96 %-99 %] 99 %  Gen: AAO, NAD  Resp: breathing unlabored  Abd: soft, NT, ND, fundus firm below umiblicus     Ext: NT, trace edema    Current Facility-Administered Medications   Medication Dose   • oxytocin (PITOCIN) infusion (for post delivery)  125 mL/hr   • lactated ringers infusion     • PRN oxytocin (PITOCIN) (20 Units/1000 mL) PRN for excessive uterine bleeding - See Admin Instr  125-999 mL/hr   • miSOPROStol (CYTOTEC) tablet 600 mcg  600 mcg   • docusate sodium (COLACE) capsule 100 mg  100 mg   • magnesium hydroxide (MILK OF MAGNESIA) suspension 30 mL  30 mL   • ibuprofen (MOTRIN) tablet 800 mg  800 mg   • acetaminophen (TYLENOL) tablet 1,000 mg  1,000 mg   • tetanus-dipth-acell pertussis (Tdap) inj 0.5 mL  0.5 mL   • measles, mumps and rubella vaccine (MMR) injection 0.5 mL   0.5 mL   • prenatal plus vitamin (STUARTNATAL 1+1) 27-1 MG tablet 1 Tablet  1 Tablet   • oxyCODONE immediate-release (ROXICODONE) tablet 5 mg  5 mg       Recent Labs     07/22/22  0125 07/23/22  0152   WBC 11.2* 8.5   RBC 4.41 3.36*   HEMOGLOBIN 13.3 10.0*   HEMATOCRIT 37.6 28.8*   MCV 85.3 85.7   MCH 30.2 29.8   MCHC 35.4* 34.7   RDW 39.6 40.3   PLATELETCT 218 175   MPV 10.2 10.3         Activity/ Discharge Instructions::   Discharge to home  Pelvic Rest x 6 weeks  No heavy lifting x4 weeks  Call or come to ED for: heavy vaginal bleeding, fever >100.4, severe abdominal pain, severe headache, chest pain, shortness of breath,  N/V, abnormal vaginal discharge, or other concerns.       Follow up:  5-6wks for PP visit.     Discharge Meds:      Medication List      You have not been prescribed any medications.            Sara Szymanski MD

## 2022-07-23 NOTE — DISCHARGE INSTRUCTIONS

## 2022-07-23 NOTE — PROGRESS NOTES
Patient assessment completed. Patient chart and orders reviewed. Reviewed plan of care with patient and patient verbalizes understanding. Monitoring of patient condition will continue.

## 2023-08-21 ENCOUNTER — APPOINTMENT (RX ONLY)
Dept: URBAN - METROPOLITAN AREA CLINIC 15 | Facility: CLINIC | Age: 33
Setting detail: DERMATOLOGY
End: 2023-08-21

## 2023-08-21 DIAGNOSIS — L738 OTHER SPECIFIED DISEASES OF HAIR AND HAIR FOLLICLES: ICD-10-CM | Status: INADEQUATELY CONTROLLED

## 2023-08-21 DIAGNOSIS — D18.0 HEMANGIOMA: ICD-10-CM

## 2023-08-21 DIAGNOSIS — D22 MELANOCYTIC NEVI: ICD-10-CM

## 2023-08-21 DIAGNOSIS — L663 OTHER SPECIFIED DISEASES OF HAIR AND HAIR FOLLICLES: ICD-10-CM | Status: INADEQUATELY CONTROLLED

## 2023-08-21 DIAGNOSIS — L73.9 FOLLICULAR DISORDER, UNSPECIFIED: ICD-10-CM | Status: INADEQUATELY CONTROLLED

## 2023-08-21 DIAGNOSIS — L81.4 OTHER MELANIN HYPERPIGMENTATION: ICD-10-CM

## 2023-08-21 DIAGNOSIS — Z71.89 OTHER SPECIFIED COUNSELING: ICD-10-CM

## 2023-08-21 DIAGNOSIS — L82.1 OTHER SEBORRHEIC KERATOSIS: ICD-10-CM

## 2023-08-21 DIAGNOSIS — B36.0 PITYRIASIS VERSICOLOR: ICD-10-CM | Status: INADEQUATELY CONTROLLED

## 2023-08-21 PROBLEM — D22.5 MELANOCYTIC NEVI OF TRUNK: Status: ACTIVE | Noted: 2023-08-21

## 2023-08-21 PROBLEM — D18.01 HEMANGIOMA OF SKIN AND SUBCUTANEOUS TISSUE: Status: ACTIVE | Noted: 2023-08-21

## 2023-08-21 PROBLEM — L02.222 FURUNCLE OF BACK [ANY PART, EXCEPT BUTTOCK]: Status: ACTIVE | Noted: 2023-08-21

## 2023-08-21 PROBLEM — D22.71 MELANOCYTIC NEVI OF RIGHT LOWER LIMB, INCLUDING HIP: Status: ACTIVE | Noted: 2023-08-21

## 2023-08-21 PROCEDURE — ? SUNSCREEN RECOMMENDATIONS

## 2023-08-21 PROCEDURE — ? COUNSELING

## 2023-08-21 PROCEDURE — 99203 OFFICE O/P NEW LOW 30 MIN: CPT

## 2023-08-21 PROCEDURE — ? ADDITIONAL NOTES

## 2023-08-21 ASSESSMENT — LOCATION DETAILED DESCRIPTION DERM
LOCATION DETAILED: EPIGASTRIC SKIN
LOCATION DETAILED: RIGHT MEDIAL INFERIOR CHEST
LOCATION DETAILED: SUPERIOR THORACIC SPINE
LOCATION DETAILED: LEFT PROXIMAL DORSAL FOREARM
LOCATION DETAILED: RIGHT PROXIMAL CALF
LOCATION DETAILED: SUBXIPHOID
LOCATION DETAILED: LEFT ANTERIOR DISTAL UPPER ARM
LOCATION DETAILED: RIGHT SUPERIOR UPPER BACK

## 2023-08-21 ASSESSMENT — LOCATION SIMPLE DESCRIPTION DERM
LOCATION SIMPLE: LEFT UPPER ARM
LOCATION SIMPLE: CHEST
LOCATION SIMPLE: LEFT FOREARM
LOCATION SIMPLE: RIGHT CALF
LOCATION SIMPLE: UPPER BACK
LOCATION SIMPLE: ABDOMEN
LOCATION SIMPLE: RIGHT UPPER BACK

## 2023-08-21 ASSESSMENT — LOCATION ZONE DERM
LOCATION ZONE: ARM
LOCATION ZONE: TRUNK
LOCATION ZONE: LEG

## 2023-08-21 NOTE — PROCEDURE: ADDITIONAL NOTES
Render Risk Assessment In Note?: no
Additional Notes: Recommended washing the area w/ Celsun Blue or Neutrogena T-gel for 3-5 minutes the rinse 2-3 days a week
Detail Level: Detailed
Additional Notes: Recommended OTC Panoxyl BP wash

## 2023-08-21 NOTE — PROCEDURE: COUNSELING
Detail Level: Generalized
Sunscreen Recommendation Label Override: Broad Spectrum Sunscreen minimum SPF 30+
Detail Level: Simple
Detail Level: Detailed

## 2024-05-14 ENCOUNTER — OFFICE VISIT (OUTPATIENT)
Dept: OBGYN | Facility: CLINIC | Age: 34
End: 2024-05-14
Payer: COMMERCIAL

## 2024-05-14 DIAGNOSIS — O92.29 SORE NIPPLES DUE TO LACTATION: ICD-10-CM

## 2024-05-14 DIAGNOSIS — O92.70 LACTATION PROBLEM: ICD-10-CM

## 2024-05-14 PROCEDURE — 99215 OFFICE O/P EST HI 40 MIN: CPT | Performed by: NURSE PRACTITIONER

## 2024-05-14 NOTE — PROGRESS NOTES
"Summary: Alba very spitty in the hospital, less interest in eating but by day 3 ready to eat all day, milk supply responsive, engorged yesterday and pumped for relief on the right side. Dad fed back some milk this morning easily. Otherwise exclusive breastfeeding, nipples less tender, difficulty with latch on the right, no nipple damage. Output good, weight up 0.4oz today from yesterday.   Today: Mom independently latched, difficulty on the right and although football was easier, it didn't help facilitate latch, moved to the left , removed 0.8oz and then tried the right which went easier and another 0.9oz, total intake 1.7oz or 51ml, appropriate for a day 4 baby.   Plan: Start on the left each time to make the right easier - she'll learn it. Likely neck preferencecreating some difficulty. May support her head in the car seat to prevent leaning to the preferred site, have her brothers, call her from side to side - she will work it on her own.  Frenulum is not making breastfeeding difficult not but would have Dr. Rodriguez evaluate it for a frenotomy. Referral placed for Alba  Follow up:   Lactation appointment: As needed  Baby 's Provider appointment: 14 Day Well Child Check   Referrals: Infant ENT for frenotomy     Maternal Diagnosis/Problem:  Sore Nipple(s) and Lactation Problem  Infant Diagnosis/Problem:  Ankyloglossia    Subjective:     Raysa Stephen is a 33 y.o. female here for lactation care. She is here today with her baby.    Concerns:   Maternal: Latch on difficulties , Weight check, Infant feeding evaluation, and Breastfeeding questions   Infant: Infant \"tongue tied\"     HPI:   Pertinent  history:     Mother does not have a history of advanced maternal age, GDM, hypertension prior to pregnancy, GHTN, insulin resistance, multiple gestation, PCOS, thyroid disease, auto immune disease , placenta encapsulation, and breast surgery    Breast changes in pregnancy: Yes  Breast surgeries: " No    FEEDING HISTORY:    Previous Breastfeeding History: Brestfed first 2 children  Hospital Course: Alba very spitty in the hospital.  Currently 5/14/2024: By day 3 ready to eat all day, milk supply responsive, engorged yesterday and pumped for relief on the right side. Dad fed back some milk this morning easily. Otherwise exclusive breastfeeding, nipples less tender, difficulty with latch on the right, no nipple damage. Output good, weight up 0.4oz today from yesterday.      Both breasts: Yes    Breast Pumping:  Frequency: Once for engorgement relief    Maternal ROS:  Constitutional: No fever, chills. Feeling well  Breasts: No soreness of breasts and Soreness of nipples   Psychiatric: No mood changes  Mental Health: No mention of feeling irritable, agitated, angry, overwhelmed, apathetic, appetite changes, exhausted nor having sleep changes outside infant feeds/demands.  No current outpatient medications on file prior to visit.     No current facility-administered medications on file prior to visit.      No past medical history on file.     Infant ROS   Constitutional: Good appetite, content. Negative for poor po intake, negative for weight loss.   Head: Negative for abnormal head shape, negative for congestion, runny nose.  Eyes: Negative for discharge from eyes or redness.   Respiratory: Negative for difficulty breathing or noisy breathing.  Gastrointestinal: Negative for decreased oral intake, vomiting, excessive spitting up, constipation or blood in stool.   No concerns about umbilical stump.  Genitourinary:  24 hours voiding pattern, ample.   Musculoskeletal: Negative for sign of arm pain or leg pain. Negative for any concerns for strength and or movement.  Skin: Negative for rash or skin infection.  Neurological: Negative for lethargy or weakness.     Objective:     Maternal Physical Lactation Exam  General: No acute distress  Breasts: Symmetrical , Full, Plugged Duct - no evidence, and Mastitis  - no  S/S  Nipples: intact  Psychiatric: Normal mood and affect. Her behavior is normal. Judgment and thought content normal.  Mental Health: Did NOT exhibit sadness, crying, feeling overwhelmed, agitation or hypervigilance.    Infant  Lactation Exam:   Oral: Tongue lift 25%, Tongue extension jut to gum line, Lingual frenum appearance Coryllos type 2,   Maxillary labial frenum forms a seal at the breast.   Infant Weight Gain: WNL  Assessment/Plan & Lactation Counseling:     Infant Exam on Infant Chart    Infant Weight History:   5/10/24 8#  5/14/2024  7#7.4oz    Infant Intake at Breast:      Total: 1.7oz  Milk Transfer at this feeding:   Effective breastfeeding     Pumped: Not indicated   Initiation of Feeding: Infant initiates  Attachment Achieved: rapidly  Nipple shield: N/A       Suck Pattern at the Breast: Suck burst and normal rest  Suck Pattern on the Bottle: Not Indicated   Behavior Following Observed Feeding: content  Nipple Pain: resolving    Latch: Mom latches independently and Assisted latch  Suckling/Feeding: attaches, baby fed effectively, baby roots, elicits RICHARD, and rhythmic  Sucking strength: Moderate Strong  Sucking Rhythm Coordinated   Compression: WNL    Once latched, baby fell into a mature and fully integrated SSB pattern.    Swallowing No difficulty noted  If functional feeding, it is quiet, rhythmic, coordinated, organized, effeicent safe, satisfying and pleasurable for both parent and baby? almost  Milk Supply Available: normal        INFANT BREASTFEEDING PLAN  (Babies and parents change and adapt  or mal-adapt, to each other, so a plan today is only as good as it facilitates the families goals, follow up is key in a dynamic process as breastfeeding.)  Discussed with family present detailed plan for establishing/maintaining family specific goals with breastfeeding available on Mom’s My Chart   Infant specific: Topics advised, taught and or reviewed included:     MATERNAL PERSONALIZED BREASTFEEDING  PLAN  (Babies and parents change and adapt  or mal-adapt, to each other, so a plan today is only as good as it facilitates the families goals, follow up is key in a dynamic process as breastfeeding.)  Discussed concerns and symptoms as listed above in assessment and guidance summarized below. Shared decision making was used between myself and the family for this encounter, home care, and follow up. Topics advised, taught and or reviewed included:   The nature of infants oral head/neck structure and function and its impact on latch and transfer of milk.   Discussed Unilateral neck rotation is a normal  finding that should correct itself over the next few weeks.    However when there is a neck preference it can make latching more difficult.    Infant head can be supported in the car seat.    Bottle feeding baby's can be encouraged to look to the opposite side of the preference  Infant can be can talked to from the side encouraging baby to turn to.   Milk Supply is dependent on glandular tissue development, hormonal influences, how many times the baby removes milk and how well the breasts are emptied in a 24 hour period. This is a biological reality that we can NOT work around. If, for any reason, your baby is not latching, or you are not able to nurse, then it is important for you to remove the milk instead by pumping or hand expression.  There's no magic trick, tea, food, drink, cookie or supplement that will increase your milk supply. One must effectively remove milk to continue to make and maximize milk. In the early days and weeks that can be 8+ times in 24 hours. For older babies, on average 6-7 + times in 24 hours.    Hydration: Staying hydrated is important however lack of hydration is usually not a cause of significantly low milk production.  Everyone needs a different amount of water, depending on their activity and diet. A high salt and/or high-protein diet, high physical activity, or very warm  weather/sweating will require more fluids. A person eating a diet high in veggies and fruit, with a lack of physical activity will require fewer fluids. There is no magic number for the # of ounces of water each day.The best way to know that you are well hydrated is by looking at your urine.  Urine should look clear to light pale yellow, and you should need to pee at least every 3 to 4 hours unless you have a large bladder capacity.  Herbs and Medications: A galactagogue is an herb or medication taken by a breastfeeding mother to increase her milk supply. We know that for centuries mothers around the world have sought out remedies to increase their milk supply. However, there is limited research on the safety and effectiveness of herbal galactagogues, which makes it hard for us to endorse them. It is not known if any of these herbs are truly effective, and it is difficult to predict how a specific herbal galactagogue will affect an individual, requiring “trial and error” in many situations. When effective, results are generally seen within 24-72 hours of starting an herbal galactagogue. Many of these herbs are used to decrease high blood sugar. If you are diabetic or have problems with low blood sugar, or thyroid disease you may not be a candidate for herbs. Not all women can increase their low milk supply with a galactagogue due to the many underlying causes of low milk production.  When taking a galactagogue, remember that frequent milk removal is still the most effective way to increase supply.   Feeding:   Infant feeding well given current interval growth, guidelines to follow:  Feed your baby every 1.5-3 hours, more often if baby acts hungry.   8-12x/24hours, these will be irregular intervals  Responsive feedings - baby cues and parents respond  Awaken baby for feeding if going over 3 hours in the day.   Until back to birth weight, ONE four hour at night is acceptable if has had 8 prior feedings in 24 hours.     Supplement:   No supplement is needed  Nipple care:    May apply breastmilk  Moist-oily ointment after feeding/pumping, ie Lanolin nipple butter, coconut or olive oil, if desired/needed 2-3 times/day until nipples are healed  You do not need to wash this off before pumping or feeding the baby  You may want to remove baby from breast when active swallowing stops to avoid prolonged nipple compression  Hydrogels recommended  Medela Hydrogels, Lansinoh Soothies and Ameda comfort gels are all the same, different companies   Never use APNO  francesca 8 hours for 48 hours in between the Advil dosing if needed for more pain relief.  Answers to FAQs: https://www.infantrisk.com/category/[email or web address]  Alcohol & Breastfeeding: What's your time-to-zero?  Cough & Cold Medications while Breastfeeding  Vitamin D Supplementation and Breastfeeding  Antidepressant Use While Breastfeeding: What should I know?  Breastfeeding, Caffeine, and Energy Drinks  Recommended resources:  Physicians guide to breastfeeding, must up to date and accurate information available on breastfeeding. [email or web address]eding.org/    Spoiler alert!  Parenting can be really hard. There is so much to learn when it comes to caring for small humans.  It can feel lonely and unsettling.  Our groups, are parenting and feeding support groups that's all about feeding/growing rodolfo.   Babies welcome.   Questions expected.   We will help you find your village!   Weight Checks  Breastfeeding [place] [facility]  WEIGHT CHECKS  Tuesdays 10am - 11am. Women's Health at [address] [place] [address], [address] [place] [address], 3rd floor conference room  Check your baby's weight, do a feeding and see how your baby is growing, visit with other mothers, plan on a walk or coffee date after group.  Please download the ayanna: Growth: Baby and Child for [employer] or Child Growth Tracker for [employer] to chart and follow your baby's growth curve.  Due to space limitations - limit strollers please (New c/section  moms please use your stroller).  We would love to have dads stay, but moms won't breastfeed if there are men in the room, sorry.  The room is generally scheduled for another event following group.  Please take all diapers with you   Online Breastfeeding [place] Online Group  Thursdays, 12 noon - 1 pm Facilitated by Amado CABRERA  Zoom Link: [email or web address]   Passcode [id number]   [phone]  Connect with other mothers:  Facebook:   [employer] Breastfeeds: [email or web address]  Well-Nourished Babies (Private group for questions and support): [email or web address]  Online Breastfeeding [place] Online Group  Thursdays, 12 noon - 1 pm Facilitated by Amado CABRERA  Zoom Link: [email or web address]   Passcode [id number]   [phone]  Breastfeeding [place] including opportunity to weight your baby and do before and after weights   Tuesdays 10am - 11am. Women's Health at [address] [place] [address], [address] [place] [address], 3rd floor conference room  Check your baby's weight, do a feeding and see how your baby is growing, visit with other mothers, plan on a walk or coffee date after group.  Please download Growth: Baby and Child for [employer] or Child Growth Tracker for [employer] if you would like to  document and follow your baby's growth curve.  Due to space limitations - limit strollers please (New c/section moms please use your stroller).  We would love to have dads stay, but moms won't breastfeed if there are men in the room, sorry.  The room is generally scheduled for another event following group.  Please take all diapers with you   PSI ONLINE SUPPORT GROUPS  [employer] (PSI) support groups are conducted using a seye-xj-jvyy support model and are not intended for those experiencing a mental health crisis. Groups are 90 minutes (1.5  hours) in length. The first ~30 minutes is providing information, education, and establishing group guidelines. The next ~60 minutes is “talk time,” in which group members share and talk with each other. Group members must be present for the group guidelines before joining in the discussion or “talk time.”     Position, Latch and Pumping discussed and plan provided. (Documented on moms chart).     Infant Exam Summary:    Healthy day 4  old.  Anticipatory guidance was provided regarding feedings.   Weight  good interval growth:  Created a plan to meet family's breastfeeding goals.  Patient learning to breastfeed and needs more support on the left breast, will start with the right each time and work on the neck preference.  Patient referred to Dr. Michael HORTON.    Contact Breastfeeding Medicine    or your Pediatrician for any of the following:   Decreased wet or poopy diapers  Decreased feeding  Baby not waking up for feeds on own most of time.   Irritability  Lethargy  Dry sticky mouth.   Any breastfeeding questions or concerns.    In Conclusion:   Managing breastfeeding and milk supply is very dynamic,can be a complex and intimate journey, and is not one size fits all. When obstacles present themselves, it takes confidence, persistence and support. The rights of the child include optimal nutrition and mothers need help to make informed decisions. You  and your baby have been screened for biological, psychological, and social risk factors that might interfere with achieving your goals.  Support is critical. We want the family thriving not just tolerating life. You are now the focus of our Breastfeeding Medicine team; we are here to support your decisions and vision.     Follow up   Please call [phone] our voicemail line or the front office at [phone] to scheduled your next appointment.  Family is encouraged to e-mail (for Amado, may use [email or web address] or [email or web address]  to update how the plan is working.     A total  of 60 minutes, not including infant assessment time,  was spent preparing to see the patient, obtaining and reviewing separately obtained history, performing a medically appropriate examination and evaluation, counseling and educating the family,communicating with other health care professionals, documenting clinical information in the electronic health record, independently interpreting weighted feeds and infant growth results, communicating these results to the family and care coordination as detailed in the above note.       NURY Garcia.